# Patient Record
Sex: FEMALE | Race: WHITE
[De-identification: names, ages, dates, MRNs, and addresses within clinical notes are randomized per-mention and may not be internally consistent; named-entity substitution may affect disease eponyms.]

---

## 2019-11-27 ENCOUNTER — HOSPITAL ENCOUNTER (OUTPATIENT)
Dept: HOSPITAL 88 - MRI | Age: 29
End: 2019-11-27
Attending: SPECIALIST
Payer: COMMERCIAL

## 2019-11-27 DIAGNOSIS — S83.221A: Primary | ICD-10-CM

## 2019-11-29 NOTE — DIAGNOSTIC IMAGING REPORT
TECHNIQUE:

Magnetic resonance imaging of the  RIGHT   Knee was performed WITHOUT

intravenous contrast. 



COMPARISON: None.



FINDINGS: 



Joint effusion: Trace joint effusion is present. Hoffa's fat pad is

unremarkable.



Menisci: The medial and lateral meniscus both appear normal. No meniscal tear

is seen.



Tendons and Ligaments: The ACL and the PCL are intact. The collateral ligaments

are unremarkable. The iliotibial band is unremarkable.  The extensor mechanism

appears normal.



Articular Cartilage: The articular cartilage is normal in thickness. No focal

defects are seen.



Bone: The bone and bone marrow are normal. 



IMPRESSION: 

Trace joint effusion. No meniscal tear. No acute ligamentous injury.



Signed by: Eloy Peres MD on 11/29/2019 9:32 AM